# Patient Record
Sex: FEMALE | Race: OTHER | NOT HISPANIC OR LATINO
[De-identification: names, ages, dates, MRNs, and addresses within clinical notes are randomized per-mention and may not be internally consistent; named-entity substitution may affect disease eponyms.]

---

## 2020-10-27 ENCOUNTER — TRANSCRIPTION ENCOUNTER (OUTPATIENT)
Age: 30
End: 2020-10-27

## 2020-12-22 ENCOUNTER — INPATIENT (INPATIENT)
Facility: HOSPITAL | Age: 30
LOS: 0 days | Discharge: HOME | DRG: 603 | End: 2020-12-23
Attending: SURGERY | Admitting: SURGERY
Payer: COMMERCIAL

## 2020-12-22 VITALS
WEIGHT: 199.96 LBS | HEIGHT: 64 IN | SYSTOLIC BLOOD PRESSURE: 142 MMHG | OXYGEN SATURATION: 99 % | DIASTOLIC BLOOD PRESSURE: 88 MMHG | HEART RATE: 107 BPM | RESPIRATION RATE: 16 BRPM | TEMPERATURE: 98 F

## 2020-12-22 LAB
ACETONE SERPL-MCNC: NEGATIVE — SIGNIFICANT CHANGE UP
ALBUMIN SERPL ELPH-MCNC: 3.7 G/DL — SIGNIFICANT CHANGE UP (ref 3.3–5.2)
ALP SERPL-CCNC: 79 U/L — SIGNIFICANT CHANGE UP (ref 40–120)
ALT FLD-CCNC: 15 U/L — SIGNIFICANT CHANGE UP
ANION GAP SERPL CALC-SCNC: 13 MMOL/L — SIGNIFICANT CHANGE UP (ref 5–17)
AST SERPL-CCNC: 11 U/L — SIGNIFICANT CHANGE UP
BASOPHILS # BLD AUTO: 0.02 K/UL — SIGNIFICANT CHANGE UP (ref 0–0.2)
BASOPHILS NFR BLD AUTO: 0.1 % — SIGNIFICANT CHANGE UP (ref 0–2)
BILIRUB SERPL-MCNC: 0.5 MG/DL — SIGNIFICANT CHANGE UP (ref 0.4–2)
BUN SERPL-MCNC: 8 MG/DL — SIGNIFICANT CHANGE UP (ref 8–20)
CALCIUM SERPL-MCNC: 10 MG/DL — SIGNIFICANT CHANGE UP (ref 8.6–10.2)
CHLORIDE SERPL-SCNC: 98 MMOL/L — SIGNIFICANT CHANGE UP (ref 98–107)
CO2 SERPL-SCNC: 26 MMOL/L — SIGNIFICANT CHANGE UP (ref 22–29)
CREAT SERPL-MCNC: 0.5 MG/DL — SIGNIFICANT CHANGE UP (ref 0.5–1.3)
EOSINOPHIL # BLD AUTO: 0.05 K/UL — SIGNIFICANT CHANGE UP (ref 0–0.5)
EOSINOPHIL NFR BLD AUTO: 0.3 % — SIGNIFICANT CHANGE UP (ref 0–6)
GLUCOSE SERPL-MCNC: 344 MG/DL — HIGH (ref 70–99)
HCG SERPL-ACNC: <4 MIU/ML — SIGNIFICANT CHANGE UP
HCT VFR BLD CALC: 41.3 % — SIGNIFICANT CHANGE UP (ref 34.5–45)
HGB BLD-MCNC: 14.5 G/DL — SIGNIFICANT CHANGE UP (ref 11.5–15.5)
IMM GRANULOCYTES NFR BLD AUTO: 0.4 % — SIGNIFICANT CHANGE UP (ref 0–1.5)
LYMPHOCYTES # BLD AUTO: 12.1 % — LOW (ref 13–44)
LYMPHOCYTES # BLD AUTO: 2.08 K/UL — SIGNIFICANT CHANGE UP (ref 1–3.3)
MCHC RBC-ENTMCNC: 28.2 PG — SIGNIFICANT CHANGE UP (ref 27–34)
MCHC RBC-ENTMCNC: 35.1 GM/DL — SIGNIFICANT CHANGE UP (ref 32–36)
MCV RBC AUTO: 80.2 FL — SIGNIFICANT CHANGE UP (ref 80–100)
MONOCYTES # BLD AUTO: 1.19 K/UL — HIGH (ref 0–0.9)
MONOCYTES NFR BLD AUTO: 6.9 % — SIGNIFICANT CHANGE UP (ref 2–14)
NEUTROPHILS # BLD AUTO: 13.84 K/UL — HIGH (ref 1.8–7.4)
NEUTROPHILS NFR BLD AUTO: 80.2 % — HIGH (ref 43–77)
PLATELET # BLD AUTO: 237 K/UL — SIGNIFICANT CHANGE UP (ref 150–400)
POTASSIUM SERPL-MCNC: 4.1 MMOL/L — SIGNIFICANT CHANGE UP (ref 3.5–5.3)
POTASSIUM SERPL-SCNC: 4.1 MMOL/L — SIGNIFICANT CHANGE UP (ref 3.5–5.3)
PROT SERPL-MCNC: 7.7 G/DL — SIGNIFICANT CHANGE UP (ref 6.6–8.7)
RBC # BLD: 5.15 M/UL — SIGNIFICANT CHANGE UP (ref 3.8–5.2)
RBC # FLD: 12.3 % — SIGNIFICANT CHANGE UP (ref 10.3–14.5)
SODIUM SERPL-SCNC: 137 MMOL/L — SIGNIFICANT CHANGE UP (ref 135–145)
WBC # BLD: 17.25 K/UL — HIGH (ref 3.8–10.5)
WBC # FLD AUTO: 17.25 K/UL — HIGH (ref 3.8–10.5)

## 2020-12-22 PROCEDURE — 71260 CT THORAX DX C+: CPT | Mod: 26

## 2020-12-22 PROCEDURE — 99284 EMERGENCY DEPT VISIT MOD MDM: CPT

## 2020-12-22 RX ORDER — ACETAMINOPHEN 500 MG
650 TABLET ORAL EVERY 6 HOURS
Refills: 0 | Status: DISCONTINUED | OUTPATIENT
Start: 2020-12-22 | End: 2020-12-23

## 2020-12-22 RX ORDER — SODIUM CHLORIDE 9 MG/ML
1000 INJECTION INTRAMUSCULAR; INTRAVENOUS; SUBCUTANEOUS
Refills: 0 | Status: DISCONTINUED | OUTPATIENT
Start: 2020-12-22 | End: 2020-12-23

## 2020-12-22 RX ORDER — MORPHINE SULFATE 50 MG/1
4 CAPSULE, EXTENDED RELEASE ORAL ONCE
Refills: 0 | Status: DISCONTINUED | OUTPATIENT
Start: 2020-12-22 | End: 2020-12-22

## 2020-12-22 RX ORDER — SODIUM CHLORIDE 9 MG/ML
1000 INJECTION INTRAMUSCULAR; INTRAVENOUS; SUBCUTANEOUS ONCE
Refills: 0 | Status: COMPLETED | OUTPATIENT
Start: 2020-12-22 | End: 2020-12-22

## 2020-12-22 RX ORDER — KETOROLAC TROMETHAMINE 30 MG/ML
15 SYRINGE (ML) INJECTION ONCE
Refills: 0 | Status: DISCONTINUED | OUTPATIENT
Start: 2020-12-22 | End: 2020-12-22

## 2020-12-22 RX ADMIN — SODIUM CHLORIDE 2000 MILLILITER(S): 9 INJECTION INTRAMUSCULAR; INTRAVENOUS; SUBCUTANEOUS at 19:43

## 2020-12-22 RX ADMIN — SODIUM CHLORIDE 125 MILLILITER(S): 9 INJECTION INTRAMUSCULAR; INTRAVENOUS; SUBCUTANEOUS at 23:54

## 2020-12-22 RX ADMIN — Medication 15 MILLIGRAM(S): at 20:42

## 2020-12-22 RX ADMIN — Medication 100 MILLIGRAM(S): at 20:39

## 2020-12-22 RX ADMIN — Medication 650 MILLIGRAM(S): at 23:54

## 2020-12-22 RX ADMIN — SODIUM CHLORIDE 125 MILLILITER(S): 9 INJECTION INTRAMUSCULAR; INTRAVENOUS; SUBCUTANEOUS at 20:39

## 2020-12-22 RX ADMIN — MORPHINE SULFATE 4 MILLIGRAM(S): 50 CAPSULE, EXTENDED RELEASE ORAL at 19:43

## 2020-12-22 NOTE — ED STATDOCS - PROGRESS NOTE DETAILS
pt is seen by Dr Loza initially agreed with hx , PE and plan   seen the pt at the bed side states pain is improved with pain med firm abscess un mid thoracic spine . TTP and no d.c. pending CT called consulted surgery surgery request admission of the pt . consult endocrinology for uncontrol DM by   spoke with pt admit to surgery

## 2020-12-22 NOTE — ED ADULT NURSE NOTE - OBJECTIVE STATEMENT
Pt AAOX3, pt c/o an upper back abscess that she has had for the past 2 days, pt states that she had once had an abscess in her armpit but never her back, pt states that it has been getting larger, pt denies any drainage, pt denies fever/chills, pt states "it is very painful", pt respirations even and unlabored, pt denies chest pain/SOB, pt abdomen soft, nondistended, and nontender, pt able to move all extremities well

## 2020-12-22 NOTE — ED STATDOCS - ATTENDING CONTRIBUTION TO CARE
I, Dr. Loza, performed the initial face to face bedside interview with this patient regarding history of present illness, review of symptoms and relevant past medical, social and family history.  I completed an independent physical examination.  I was the initial provider who evaluated this patient. I have signed out the follow up of any pending tests (i.e. labs, radiological studies) to the ACP.  I have communicated the patient’s plan of care and disposition with the ACP.

## 2020-12-22 NOTE — ED STATDOCS - SKIN, MLM
large 4 x3 cm firm lesion palpated to right mid upper back, surrounding erythema, no fluctuance palpating

## 2020-12-22 NOTE — ED STATDOCS - CARE PLAN
Principal Discharge DX:	Abscess of back  Secondary Diagnosis:	Hyperglycemia   Principal Discharge DX:	Abscess of back  Assessment and plan of treatment:	cellulitic upper back  Secondary Diagnosis:	Hyperglycemia

## 2020-12-22 NOTE — ED STATDOCS - OBJECTIVE STATEMENT
30 y.o F with a PMHx of DM and Abscess presents to the ED with c/o a painful abscess in the back that started occurred two days ago. Pt has a hx of abscesses in her arms but not in the back area. Pt was referred to the ED by urgent care. Pt states that she only has had an abscess in her armpit one time. Pt notes that the mass in her back has been growing. Pt has been a diabetic since 2016. Pt also notes some tenderness along the left side of her neck.

## 2020-12-22 NOTE — ED STATDOCS - CLINICAL SUMMARY MEDICAL DECISION MAKING FREE TEXT BOX
Pt with cellulitis to right upper back, possible abscess, deeper will check labs, order ct scan, pain meds, IV, and reassess. No drainage should be done at this tome due fluctuance.

## 2020-12-23 ENCOUNTER — TRANSCRIPTION ENCOUNTER (OUTPATIENT)
Age: 30
End: 2020-12-23

## 2020-12-23 VITALS
DIASTOLIC BLOOD PRESSURE: 81 MMHG | HEART RATE: 97 BPM | RESPIRATION RATE: 17 BRPM | TEMPERATURE: 98 F | OXYGEN SATURATION: 98 % | SYSTOLIC BLOOD PRESSURE: 124 MMHG

## 2020-12-23 DIAGNOSIS — L02.212 CUTANEOUS ABSCESS OF BACK [ANY PART, EXCEPT BUTTOCK]: ICD-10-CM

## 2020-12-23 LAB
A1C WITH ESTIMATED AVERAGE GLUCOSE RESULT: 12.9 % — HIGH (ref 4–5.6)
ANION GAP SERPL CALC-SCNC: 11 MMOL/L — SIGNIFICANT CHANGE UP (ref 5–17)
BASOPHILS # BLD AUTO: 0.02 K/UL — SIGNIFICANT CHANGE UP (ref 0–0.2)
BASOPHILS NFR BLD AUTO: 0.1 % — SIGNIFICANT CHANGE UP (ref 0–2)
BUN SERPL-MCNC: 9 MG/DL — SIGNIFICANT CHANGE UP (ref 8–20)
CALCIUM SERPL-MCNC: 8.7 MG/DL — SIGNIFICANT CHANGE UP (ref 8.6–10.2)
CHLORIDE SERPL-SCNC: 102 MMOL/L — SIGNIFICANT CHANGE UP (ref 98–107)
CO2 SERPL-SCNC: 23 MMOL/L — SIGNIFICANT CHANGE UP (ref 22–29)
CREAT SERPL-MCNC: 0.44 MG/DL — LOW (ref 0.5–1.3)
EOSINOPHIL # BLD AUTO: 0.19 K/UL — SIGNIFICANT CHANGE UP (ref 0–0.5)
EOSINOPHIL NFR BLD AUTO: 1.2 % — SIGNIFICANT CHANGE UP (ref 0–6)
ESTIMATED AVERAGE GLUCOSE: 324 MG/DL — HIGH (ref 68–114)
GLUCOSE BLDC GLUCOMTR-MCNC: 259 MG/DL — HIGH (ref 70–99)
GLUCOSE BLDC GLUCOMTR-MCNC: 266 MG/DL — HIGH (ref 70–99)
GLUCOSE BLDC GLUCOMTR-MCNC: 289 MG/DL — HIGH (ref 70–99)
GLUCOSE SERPL-MCNC: 264 MG/DL — HIGH (ref 70–99)
HCT VFR BLD CALC: 35 % — SIGNIFICANT CHANGE UP (ref 34.5–45)
HGB BLD-MCNC: 12.4 G/DL — SIGNIFICANT CHANGE UP (ref 11.5–15.5)
IMM GRANULOCYTES NFR BLD AUTO: 0.5 % — SIGNIFICANT CHANGE UP (ref 0–1.5)
LYMPHOCYTES # BLD AUTO: 12.8 % — LOW (ref 13–44)
LYMPHOCYTES # BLD AUTO: 2.03 K/UL — SIGNIFICANT CHANGE UP (ref 1–3.3)
MAGNESIUM SERPL-MCNC: 1.7 MG/DL — SIGNIFICANT CHANGE UP (ref 1.6–2.6)
MCHC RBC-ENTMCNC: 28.7 PG — SIGNIFICANT CHANGE UP (ref 27–34)
MCHC RBC-ENTMCNC: 35.4 GM/DL — SIGNIFICANT CHANGE UP (ref 32–36)
MCV RBC AUTO: 81 FL — SIGNIFICANT CHANGE UP (ref 80–100)
MONOCYTES # BLD AUTO: 1.16 K/UL — HIGH (ref 0–0.9)
MONOCYTES NFR BLD AUTO: 7.3 % — SIGNIFICANT CHANGE UP (ref 2–14)
NEUTROPHILS # BLD AUTO: 12.34 K/UL — HIGH (ref 1.8–7.4)
NEUTROPHILS NFR BLD AUTO: 78.1 % — HIGH (ref 43–77)
PHOSPHATE SERPL-MCNC: 2.1 MG/DL — LOW (ref 2.4–4.7)
PLATELET # BLD AUTO: 212 K/UL — SIGNIFICANT CHANGE UP (ref 150–400)
POTASSIUM SERPL-MCNC: 3.9 MMOL/L — SIGNIFICANT CHANGE UP (ref 3.5–5.3)
POTASSIUM SERPL-SCNC: 3.9 MMOL/L — SIGNIFICANT CHANGE UP (ref 3.5–5.3)
RBC # BLD: 4.32 M/UL — SIGNIFICANT CHANGE UP (ref 3.8–5.2)
RBC # FLD: 12.4 % — SIGNIFICANT CHANGE UP (ref 10.3–14.5)
SARS-COV-2 RNA SPEC QL NAA+PROBE: SIGNIFICANT CHANGE UP
SODIUM SERPL-SCNC: 136 MMOL/L — SIGNIFICANT CHANGE UP (ref 135–145)
WBC # BLD: 15.82 K/UL — HIGH (ref 3.8–10.5)
WBC # FLD AUTO: 15.82 K/UL — HIGH (ref 3.8–10.5)

## 2020-12-23 PROCEDURE — 87070 CULTURE OTHR SPECIMN AEROBIC: CPT

## 2020-12-23 PROCEDURE — 87186 SC STD MICRODIL/AGAR DIL: CPT

## 2020-12-23 PROCEDURE — 82962 GLUCOSE BLOOD TEST: CPT

## 2020-12-23 PROCEDURE — 87205 SMEAR GRAM STAIN: CPT

## 2020-12-23 PROCEDURE — 96376 TX/PRO/DX INJ SAME DRUG ADON: CPT | Mod: XU

## 2020-12-23 PROCEDURE — 99285 EMERGENCY DEPT VISIT HI MDM: CPT | Mod: 25

## 2020-12-23 PROCEDURE — 82009 KETONE BODYS QUAL: CPT

## 2020-12-23 PROCEDURE — 36415 COLL VENOUS BLD VENIPUNCTURE: CPT

## 2020-12-23 PROCEDURE — 96375 TX/PRO/DX INJ NEW DRUG ADDON: CPT | Mod: XU

## 2020-12-23 PROCEDURE — 85025 COMPLETE CBC W/AUTO DIFF WBC: CPT

## 2020-12-23 PROCEDURE — 80053 COMPREHEN METABOLIC PANEL: CPT

## 2020-12-23 PROCEDURE — 96374 THER/PROPH/DIAG INJ IV PUSH: CPT | Mod: XU

## 2020-12-23 PROCEDURE — 83036 HEMOGLOBIN GLYCOSYLATED A1C: CPT

## 2020-12-23 PROCEDURE — 80048 BASIC METABOLIC PNL TOTAL CA: CPT

## 2020-12-23 PROCEDURE — 83735 ASSAY OF MAGNESIUM: CPT

## 2020-12-23 PROCEDURE — U0003: CPT

## 2020-12-23 PROCEDURE — 99243 OFF/OP CNSLTJ NEW/EST LOW 30: CPT | Mod: GC

## 2020-12-23 PROCEDURE — 71260 CT THORAX DX C+: CPT

## 2020-12-23 PROCEDURE — 84702 CHORIONIC GONADOTROPIN TEST: CPT

## 2020-12-23 PROCEDURE — 84100 ASSAY OF PHOSPHORUS: CPT

## 2020-12-23 PROCEDURE — 86769 SARS-COV-2 COVID-19 ANTIBODY: CPT

## 2020-12-23 RX ORDER — DEXTROSE 50 % IN WATER 50 %
25 SYRINGE (ML) INTRAVENOUS ONCE
Refills: 0 | Status: DISCONTINUED | OUTPATIENT
Start: 2020-12-23 | End: 2020-12-23

## 2020-12-23 RX ORDER — ACETAMINOPHEN 500 MG
2 TABLET ORAL
Qty: 0 | Refills: 0 | DISCHARGE
Start: 2020-12-23

## 2020-12-23 RX ORDER — SODIUM,POTASSIUM PHOSPHATES 278-250MG
1 POWDER IN PACKET (EA) ORAL ONCE
Refills: 0 | Status: DISCONTINUED | OUTPATIENT
Start: 2020-12-23 | End: 2020-12-23

## 2020-12-23 RX ORDER — SODIUM CHLORIDE 9 MG/ML
1000 INJECTION, SOLUTION INTRAVENOUS
Refills: 0 | Status: DISCONTINUED | OUTPATIENT
Start: 2020-12-23 | End: 2020-12-23

## 2020-12-23 RX ORDER — SENNA PLUS 8.6 MG/1
2 TABLET ORAL AT BEDTIME
Refills: 0 | Status: DISCONTINUED | OUTPATIENT
Start: 2020-12-23 | End: 2020-12-23

## 2020-12-23 RX ORDER — DEXTROSE 50 % IN WATER 50 %
12.5 SYRINGE (ML) INTRAVENOUS ONCE
Refills: 0 | Status: DISCONTINUED | OUTPATIENT
Start: 2020-12-23 | End: 2020-12-23

## 2020-12-23 RX ORDER — DEXTROSE 50 % IN WATER 50 %
15 SYRINGE (ML) INTRAVENOUS ONCE
Refills: 0 | Status: DISCONTINUED | OUTPATIENT
Start: 2020-12-23 | End: 2020-12-23

## 2020-12-23 RX ORDER — MAGNESIUM SULFATE 500 MG/ML
2 VIAL (ML) INJECTION ONCE
Refills: 0 | Status: DISCONTINUED | OUTPATIENT
Start: 2020-12-23 | End: 2020-12-23

## 2020-12-23 RX ORDER — INSULIN NPH HUM/REG INSULIN HM 70-30/ML
15 VIAL (ML) SUBCUTANEOUS
Qty: 2 | Refills: 0
Start: 2020-12-23 | End: 2021-01-21

## 2020-12-23 RX ORDER — MORPHINE SULFATE 50 MG/1
4 CAPSULE, EXTENDED RELEASE ORAL ONCE
Refills: 0 | Status: DISCONTINUED | OUTPATIENT
Start: 2020-12-23 | End: 2020-12-23

## 2020-12-23 RX ORDER — LACTOBACILLUS ACIDOPHILUS 100MM CELL
1 CAPSULE ORAL DAILY
Refills: 0 | Status: DISCONTINUED | OUTPATIENT
Start: 2020-12-23 | End: 2020-12-23

## 2020-12-23 RX ORDER — TRAMADOL HYDROCHLORIDE 50 MG/1
50 TABLET ORAL EVERY 6 HOURS
Refills: 0 | Status: DISCONTINUED | OUTPATIENT
Start: 2020-12-23 | End: 2020-12-23

## 2020-12-23 RX ORDER — ENOXAPARIN SODIUM 100 MG/ML
40 INJECTION SUBCUTANEOUS AT BEDTIME
Refills: 0 | Status: DISCONTINUED | OUTPATIENT
Start: 2020-12-23 | End: 2020-12-23

## 2020-12-23 RX ORDER — INSULIN LISPRO 100/ML
VIAL (ML) SUBCUTANEOUS
Refills: 0 | Status: DISCONTINUED | OUTPATIENT
Start: 2020-12-23 | End: 2020-12-23

## 2020-12-23 RX ORDER — TRAMADOL HYDROCHLORIDE 50 MG/1
1 TABLET ORAL
Qty: 8 | Refills: 0
Start: 2020-12-23 | End: 2020-12-24

## 2020-12-23 RX ORDER — KETOROLAC TROMETHAMINE 30 MG/ML
15 SYRINGE (ML) INJECTION EVERY 8 HOURS
Refills: 0 | Status: DISCONTINUED | OUTPATIENT
Start: 2020-12-23 | End: 2020-12-23

## 2020-12-23 RX ORDER — TRAMADOL HYDROCHLORIDE 50 MG/1
25 TABLET ORAL EVERY 6 HOURS
Refills: 0 | Status: DISCONTINUED | OUTPATIENT
Start: 2020-12-23 | End: 2020-12-23

## 2020-12-23 RX ORDER — ISOPROPYL ALCOHOL, BENZOCAINE .7; .06 ML/ML; ML/ML
1 SWAB TOPICAL
Qty: 100 | Refills: 1
Start: 2020-12-23 | End: 2021-02-10

## 2020-12-23 RX ORDER — GLUCAGON INJECTION, SOLUTION 0.5 MG/.1ML
1 INJECTION, SOLUTION SUBCUTANEOUS ONCE
Refills: 0 | Status: DISCONTINUED | OUTPATIENT
Start: 2020-12-23 | End: 2020-12-23

## 2020-12-23 RX ORDER — ACETAMINOPHEN 500 MG
650 TABLET ORAL EVERY 6 HOURS
Refills: 0 | Status: DISCONTINUED | OUTPATIENT
Start: 2020-12-23 | End: 2020-12-23

## 2020-12-23 RX ADMIN — Medication 100 MILLIGRAM(S): at 01:07

## 2020-12-23 RX ADMIN — SODIUM CHLORIDE 125 MILLILITER(S): 9 INJECTION INTRAMUSCULAR; INTRAVENOUS; SUBCUTANEOUS at 08:09

## 2020-12-23 RX ADMIN — Medication 9: at 12:30

## 2020-12-23 RX ADMIN — Medication 1 TABLET(S): at 12:30

## 2020-12-23 RX ADMIN — Medication 100 MILLIGRAM(S): at 05:35

## 2020-12-23 RX ADMIN — Medication 650 MILLIGRAM(S): at 12:30

## 2020-12-23 RX ADMIN — MORPHINE SULFATE 4 MILLIGRAM(S): 50 CAPSULE, EXTENDED RELEASE ORAL at 01:07

## 2020-12-23 RX ADMIN — Medication 6: at 09:02

## 2020-12-23 RX ADMIN — TRAMADOL HYDROCHLORIDE 25 MILLIGRAM(S): 50 TABLET ORAL at 08:05

## 2020-12-23 NOTE — ADVANCED PRACTICE NURSE CONSULT - ASSESSMENT
went to see pt in am pt is a+ox3 c/o 0 pain. she states has diabetes for 4 years and was on humalog pen injection 15 units 3xdaily. she stopped taking insulin a few months ago she stopped checking bg as well. @ this time she does nt have insurance due to change in employment. insurance will start in January t was educated about the importance using insulin @ this time and was advised to keep [facility] as her supplier in order to keep cost affordable. 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 a new glucometer provided. information about dr celaya provided so pt can fu w endocrine to improve glycemic control when insurance is available.

## 2020-12-23 NOTE — H&P ADULT - NSHPLABSRESULTS_GEN_ALL_CORE
LABS:                        14.5   17.25 )-----------( 237      ( 22 Dec 2020 19:55 )             41.3     12-22    137  |  98  |  8.0  ----------------------------<  344<H>  4.1   |  26.0  |  0.50    Ca    10.0      22 Dec 2020 19:55    TPro  7.7  /  Alb  3.7  /  TBili  0.5  /  DBili  x   /  AST  11  /  ALT  15  /  AlkPhos  79  12-22          IMAGING:    CT Chest w/ IV Cont (12.22.20 @ 22:32)    CHEST WALL AND LOWER NECK: 4.1 x 3.1 x 3.1 cm rounded collection containing an air-fluid level within the subcutaneous fat of the upper back, just right of midline, at the T1 level. Wall of the collection is not significantly thickened. Gas and inflammation within the surrounding subcutaneous fat. Overlying skin thickening. Heterogeneously enlarged left thyroid lobe. Nonspecific mildly prominent right axillary node measuring 2.6 x 1.1 cm (3:57).

## 2020-12-23 NOTE — H&P ADULT - ASSESSMENT
Patient is a 31yo F with history of uncontrolled IDDM, presenting with a 2-day history of back abscess.    -Recommend medicine admission for management of uncontrolled DM   -Incision and drainage at bedside, will send cultures x2  -Wound packed with WTD  -Daily dressing changes  -Surgery to follow on this admission

## 2020-12-23 NOTE — DISCHARGE NOTE PROVIDER - NSDCMRMEDTOKEN_GEN_ALL_CORE_FT
acetaminophen 325 mg oral tablet: 2 tab(s) orally every 6 hours  alcohol swabs : Apply topically to affected area 4 times a day   clindamycin 300 mg oral capsule: 2 cap(s) orally every 8 hours  Insulin Pen Needles, 4mm: 1 application subcutaneously 4 times a day. ** Use with insulin pen **   lancets: 1 application subcutaneously 4 times a day   NovoLIN 70/30 FlexPen subcutaneous suspension: 15 unit(s) subcutaneous in the morning and at bedtime   test strips (per patient&#x27;s insurance): 1 application subcutaneously 4 times a day. ** Compatible with patient&#x27;s glucometer **  traMADol 50 mg oral tablet: 1 tab(s) orally every 6 hours, As needed, Severe Pain (7 - 10) MDD:4

## 2020-12-23 NOTE — DISCHARGE NOTE PROVIDER - CARE PROVIDERS DIRECT ADDRESSES
[email or web address]

## 2020-12-23 NOTE — H&P ADULT - NSICDXFAMILYHX_GEN_ALL_CORE_FT
FAMILY HISTORY:  Father  Still living? Unknown  Family history of diabetes mellitus (DM), Age at diagnosis: Age Unknown

## 2020-12-23 NOTE — ADVANCED PRACTICE NURSE CONSULT - RECOMMEDATIONS
continue diabetes self management education  if pt is staying tonight pls start lantus @ 15 units qhs and moderate ss  if pt is going home pls consider insulin 70/30 vial and syringe from [facility] 15 units w breakfast and dinner  strips and lancets for countur next ez glucometer for 3xdaily

## 2020-12-23 NOTE — DISCHARGE NOTE NURSING/CASE MANAGEMENT/SOCIAL WORK - PATIENT PORTAL LINK FT
You can access the FollowMyHealth Patient Portal offered by [facility] by registering at the following website: [email or web address]. By joining [employer]’s FollowMyHealth portal, you will also be able to view your health information using other applications (apps) compatible with our system.

## 2020-12-23 NOTE — DISCHARGE NOTE PROVIDER - CARE PROVIDER_API CALL
Nevin Epperson)  EndocrinologyMetabDiabetes  [address], [place] [id number]  Phone: [phone]  Fax: [phone]  Follow Up Time:

## 2020-12-23 NOTE — ED CDU PROVIDER INITIAL DAY NOTE - MEDICAL DECISION MAKING DETAILS
dm uncontrol use to be  on insuline been off of the insuline x 3 month due to insurance issue   abscess and cellulitis in upper backs , called [facility] for I&D  will come to do it cont clindamycin IVF ,   DM control A1C, SW in AM for medication prescription   f.u surgery  Am labs

## 2020-12-23 NOTE — H&P ADULT - NSHPPHYSICALEXAM_GEN_ALL_CORE
GENERAL: Alert, well developed, in no acute distress.  MENTAL STATUS: AAOx3. Appropriate affect.  HEENT: PERRLA. EOMI. MMM.  Trachea midline.  RESPIRATORY: CTAB. No wheezing, rales or rhonchi.  CARDIOVASCULAR: RRR. No audible murmurs, rubs or gallops.   GASTROINTESTINAL: Abdomen soft, NT, ND, -R/-G.   INTEGUMENTARY: back mass, erythematous, indurated, non-fluctuant, tender to palpation   MUSCULOSKELETAL: No cyanosis or clubbing. No gross deformities.

## 2020-12-23 NOTE — DISCHARGE NOTE PROVIDER - HOSPITAL COURSE
HPI: Patient is a 29yo F with history of IDDM since 2016 presenting to the ED with a back mass. Patient reports she first noticed the mass 2 days ago, and since it has increase in size and became very tender. She mentions that she had prior hx of an abscess on her armpit that was I&D. Patient denies fevers, chills, chest pain, sob, n/v or generalized malaise.    Hospital Course: CT Chest was performed in ED which should round fluid collection (4.1x3.1x3.1 cm) w/ air-fluid level w/ surrounding gas and inflammation within upper back. Patient was admitted to the surgery service under Dr. Crane. Patient was started on Clindamycin. Leukocytosis was 17 on arrival. Patient had I&D of back abscess bedside. The wound was then packed with 1 inch plain packing and covered with 4x4 gauze and tape. Dressing changes were performed daily while in the hospital. Abx were transferred to [facility] and patient will complete a 10 day course. Patient is an insulin dependent diabetic, however does not check her sugars at home and does not have insurance so does not have any insulin. The diabetic educator was consulted during admission and scheduled patient to have 70/30 insulin until 2021 when her insurance kicks in. Voiding spontaneously. Tolerating regular diet well. Pain is well controlled at time of discharge. Patient was stable for discharge home w/ friend (RN) taught how to perform dressing changes daily.       Length of time preparing discharge > 30 minutes

## 2020-12-23 NOTE — CONSULT NOTE ADULT - SUBJECTIVE AND OBJECTIVE BOX
ACUTE CARE SURGERY CONSULT    HPI: Patient is a 31yo F with history of IDDM since 2016 presenting to the ED with a back mass. Patient reports she first noticed the mass 2 days ago, and since it has increase in size and became very tender. She mentions that she had prior hx of an abscess on her armpit that was i&d'ed. Patient denies fevers, chills, chest pain, sob, n/v or generalized malaise.      PAST MEDICAL HISTORY:  Type 2 diabetes mellitus without complication, unspecified whether long term insulin use      ALLERGIES:  No Known Allergies      MEDICATIONS  (STANDING):  clindamycin IVPB 600 milliGRAM(s) IV Intermittent every 8 hours  dextrose 40% Gel 15 Gram(s) Oral once  dextrose 5%. 1000 milliLiter(s) (50 mL/Hr) IV Continuous <Continuous>  dextrose 5%. 1000 milliLiter(s) (100 mL/Hr) IV Continuous <Continuous>  dextrose 50% Injectable 25 Gram(s) IV Push once  dextrose 50% Injectable 12.5 Gram(s) IV Push once  dextrose 50% Injectable 25 Gram(s) IV Push once  glucagon  Injectable 1 milliGRAM(s) IntraMuscular once  insulin lispro (ADMELOG) corrective regimen sliding scale   SubCutaneous three times a day before meals  lactobacillus acidophilus 1 Tablet(s) Oral daily  sodium chloride 0.9%. 1000 milliLiter(s) (125 mL/Hr) IV Continuous <Continuous>    MEDICATIONS  (PRN):  acetaminophen   Tablet .. 650 milliGRAM(s) Oral every 6 hours PRN Temp greater or equal to 38C (100.4F), Moderate Pain (4 - 6)  ketorolac   Injectable 15 milliGRAM(s) IV Push every 8 hours PRN Moderate Pain (4 - 6)      VITALS & I/Os:  Vital Signs Last 24 Hrs  T(C): 37.5 (22 Dec 2020 23:32), Max: 37.5 (22 Dec 2020 23:32)  T(F): 99.5 (22 Dec 2020 23:32), Max: 99.5 (22 Dec 2020 23:32)  HR: 97 (22 Dec 2020 23:32) (97 - 107)  BP: 121/77 (22 Dec 2020 23:32) (121/77 - 142/88)  BP(mean): --  RR: 16 (22 Dec 2020 23:32) (16 - 16)  SpO2: 100% (22 Dec 2020 23:32) (99% - 100%)  CAPILLARY BLOOD GLUCOSE      POCT Blood Glucose.: 289 mg/dL (23 Dec 2020 01:47)      I&O's Summary      GENERAL: Alert, well developed, in no acute distress.  MENTAL STATUS: AAOx3. Appropriate affect.  HEENT: PERRLA. EOMI. MMM.  Trachea midline.  RESPIRATORY: CTAB. No wheezing, rales or rhonchi.  CARDIOVASCULAR: RRR. No audible murmurs, rubs or gallops.   GASTROINTESTINAL: Abdomen soft, NT, ND, -R/-G.   INTEGUMENTARY: back mass, erythematous, indurated, non-fluctuant, tender to palpation   MUSCULOSKELETAL: No cyanosis or clubbing. No gross deformities.       LABS:                        14.5   17.25 )-----------( 237      ( 22 Dec 2020 19:55 )             41.3     12-22    137  |  98  |  8.0  ----------------------------<  344<H>  4.1   |  26.0  |  0.50    Ca    10.0      22 Dec 2020 19:55    TPro  7.7  /  Alb  3.7  /  TBili  0.5  /  DBili  x   /  AST  11  /  ALT  15  /  AlkPhos  79  12-22          IMAGING:    CT Chest w/ IV Cont (12.22.20 @ 22:32)    CHEST WALL AND LOWER NECK: 4.1 x 3.1 x 3.1 cm rounded collection containing an air-fluid level within the subcutaneous fat of the upper back, just right of midline, at the T1 level. Wall of the collection is not significantly thickened. Gas and inflammation within the surrounding subcutaneous fat. Overlying skin thickening. Heterogeneously enlarged left thyroid lobe. Nonspecific mildly prominent right axillary node measuring 2.6 x 1.1 cm (3:57).

## 2020-12-23 NOTE — ED CDU PROVIDER INITIAL DAY NOTE - OBJECTIVE STATEMENT
30 y.o F with a PMHx of DM was on insuline not taking it for past x3 months due to been out of health insurance and Abscess presents to the ED with c/o a painful abscess in the back that started occurred two days ago. pain initially was 10/10 at the side that non radiating.  Pt has a hx of abscesses in her arms but not in the back area. Pt was referred to the ED by urgent care. Pt states that she only has had an abscess in her armpit one time. Pt notes that the mass in her back has been growing. Pt has been a diabetic since 2016.   pt states she use to take 15 unit insuline Humalog with meal

## 2020-12-23 NOTE — CONSULT NOTE ADULT - ATTENDING COMMENTS
Agreed with note and exam  patient with known history of diabetes with back abscess requiring incision and drainage  patient tolerated drainage well  f/u wound cultures  admit to medicine for further management  surgery will continue to follow for wound care

## 2020-12-23 NOTE — ED CDU PROVIDER INITIAL DAY NOTE - PHYSICAL EXAMINATION
· CONSTITUTIONAL: well appearing and in no apparent distress.  · EYES: clear bilaterally.  Pupils equal, round, and reactive to light.  · ENMT: Nasal mucosa clear.  Mouth with normal mucosa  Throat has no vesicles, no oropharyngeal exudates and uvula is midline.  · CARDIAC: tachycardic rate, regular rhythm, and no murmur.  · RESPIRATORY: breath sounds clear and equal bilaterally.  · GASTROINTESTINAL: abdomen soft, non-tender, and non-distended. Bowel sounds present.  · MUSCULOSKELETAL: range of motion is not limited and there is no muscle tenderness.  · SKIN: large 4 x3 cm firm lesion palpated to right mid upper back, surrounding erythema, no fluctuance palpating

## 2020-12-23 NOTE — DISCHARGE NOTE PROVIDER - NSFOLLOWUPCLINICS_GEN_ALL_ED_FT
[facility] Acute Care Surgery  Acute Care Surgery  [address] [place] [id number]  Phone: [phone]  Fax:   Follow Up Time:

## 2020-12-23 NOTE — ED CDU PROVIDER INITIAL DAY NOTE - FAMILY HISTORY
Father  Still living? Unknown  Family history of diabetes mellitus (DM), Age at diagnosis: Age Unknown

## 2020-12-23 NOTE — ED ADULT NURSE REASSESSMENT NOTE - NS ED NURSE REASSESS COMMENT FT1
Patient resting comfortably and calm on stretcher.  Pt AxO4, VSS,. Pt appears in no distress at this time. No S/S o hyper/hypoglycamia noted Safety measures taken, bed in low position, call bell within reach, side rails up x2.  Will continue to monitor.
Surgery Resident at bedside draining abscess
Received patient from the main ED RN.  Pt AxO4, VSS.  Pt denies chest pain/SOB at this time.  Cardio NSR Lft AC. IV insertion site with angio cath #20 , flushing without difficulty. Middle upper back abcess dressing  with serosanguinous drainage and was reinforced  .Currently denies any pain. Support provided ,safety maintained ,Safety measures taken, bed in low position, call bell within reach, side rails up x2.  Plan of care explained.  Pt verbalized understanding.  Will continue to monitor.

## 2020-12-23 NOTE — H&P ADULT - NSICDXPASTMEDICALHX_GEN_ALL_CORE_FT
PAST MEDICAL HISTORY:  Type 2 diabetes mellitus without complication, unspecified whether long term insulin use

## 2020-12-23 NOTE — DISCHARGE NOTE PROVIDER - NSDCCPCAREPLAN_GEN_ALL_CORE_FT
PRINCIPAL DISCHARGE DIAGNOSIS  Diagnosis: Abscess of back  Assessment and Plan of Treatment: Follow Up: Please call to make an appointment w/ Acute Care Surgery clinic 3-5 days after discharge. Also, please call to make an appointment with your primary care physician as per your usual schedule.   Activity: May return to normal activities as tolerated, however take caution with your back and prevent from being hit in that area.   Diet: May continue regular diet.  Medications: Please take all home medications as prescribed by your primary care doctor. Pain medication has been prescribed for you. Please take it as prescribed, do not drive or operate heavy machinery while taking narcotics. You are encouraged to take over-the-counter tylenol and/or ibuprofen for pain relief when you feel your pain no longer warrants the use of narcotic pain medications.  Wound Care: Dressing should be taken down daily. The wound should be cleaned with peroxide. Then the wound should be packed with 1 inch plain packing to the level of the skin. The wound can then be covered with 4x4 gauze and taped to the skin. This dressing should be performed daily until seen in the Acute Care Surgery clinic.   Patient is advised to RETURN TO THE EMERGENCY DEPARTMENT for any of the following - worsening pain, fever/chills, nausea/vomiting, alterned mental status, chest pain, shortness of breath, or any other new/worsening symptoms.      SECONDARY DISCHARGE DIAGNOSES  Diagnosis: Hyperglycemia  Assessment and Plan of Treatment: You were seen by the Diabetic educator during your admission. She recommended to check your sugar 4 times per day (before each meal and at bedtime). You were also prescribed 70/30 insulin and should take 15 units in the morning and 15 units at night. You should follow up with Dr. Epperson as an outpatient.

## 2020-12-23 NOTE — H&P ADULT - HISTORY OF PRESENT ILLNESS
Patient is a 29yo F with history of IDDM since 2016 presenting to the ED with a back mass. Patient reports she first noticed the mass 2 days ago, and since it has increase in size and became very tender. She mentions that she had prior hx of an abscess on her armpit that was i&d'ed. Patient denies fevers, chills, chest pain, sob, n/v or generalized malaise.

## 2020-12-23 NOTE — ED ADULT NURSE REASSESSMENT NOTE - COMFORT CARE
ambulated to bathroom/plan of care explained/po fluids offered/side rails up/treatment delay explained/wait time explained/warm blanket provided

## 2020-12-25 LAB
-  AMIKACIN: SIGNIFICANT CHANGE UP
-  AMOXICILLIN/CLAVULANIC ACID: SIGNIFICANT CHANGE UP
-  AMPICILLIN/SULBACTAM: SIGNIFICANT CHANGE UP
-  AMPICILLIN: SIGNIFICANT CHANGE UP
-  AZTREONAM: SIGNIFICANT CHANGE UP
-  CEFAZOLIN: SIGNIFICANT CHANGE UP
-  CEFEPIME: SIGNIFICANT CHANGE UP
-  CEFOXITIN: SIGNIFICANT CHANGE UP
-  CEFTRIAXONE: SIGNIFICANT CHANGE UP
-  CIPROFLOXACIN: SIGNIFICANT CHANGE UP
-  ERTAPENEM: SIGNIFICANT CHANGE UP
-  GENTAMICIN: SIGNIFICANT CHANGE UP
-  LEVOFLOXACIN: SIGNIFICANT CHANGE UP
-  MEROPENEM: SIGNIFICANT CHANGE UP
-  PIPERACILLIN/TAZOBACTAM: SIGNIFICANT CHANGE UP
-  TOBRAMYCIN: SIGNIFICANT CHANGE UP
-  TRIMETHOPRIM/SULFAMETHOXAZOLE: SIGNIFICANT CHANGE UP
CULTURE RESULTS: SIGNIFICANT CHANGE UP
CULTURE RESULTS: SIGNIFICANT CHANGE UP
METHOD TYPE: SIGNIFICANT CHANGE UP
ORGANISM # SPEC MICROSCOPIC CNT: SIGNIFICANT CHANGE UP
ORGANISM # SPEC MICROSCOPIC CNT: SIGNIFICANT CHANGE UP
SARS-COV-2 IGG SERPL QL IA: NEGATIVE — SIGNIFICANT CHANGE UP
SARS-COV-2 IGM SERPL IA-ACNC: <0.1 INDEX — SIGNIFICANT CHANGE UP
SPECIMEN SOURCE: SIGNIFICANT CHANGE UP
SPECIMEN SOURCE: SIGNIFICANT CHANGE UP

## 2020-12-29 DIAGNOSIS — E11.65 TYPE 2 DIABETES MELLITUS WITH HYPERGLYCEMIA: ICD-10-CM

## 2020-12-29 DIAGNOSIS — L02.212 CUTANEOUS ABSCESS OF BACK [ANY PART, EXCEPT BUTTOCK]: ICD-10-CM

## 2020-12-29 DIAGNOSIS — B96.89 OTHER SPECIFIED BACTERIAL AGENTS AS THE CAUSE OF DISEASES CLASSIFIED ELSEWHERE: ICD-10-CM

## 2021-01-19 ENCOUNTER — TRANSCRIPTION ENCOUNTER (OUTPATIENT)
Age: 31
End: 2021-01-19

## 2021-02-02 PROBLEM — E11.9 TYPE 2 DIABETES MELLITUS WITHOUT COMPLICATIONS: Chronic | Status: ACTIVE | Noted: 2020-12-23

## 2021-02-03 PROBLEM — Z00.00 ENCOUNTER FOR PREVENTIVE HEALTH EXAMINATION: Status: ACTIVE | Noted: 2021-02-03

## 2021-02-05 ENCOUNTER — APPOINTMENT (OUTPATIENT)
Dept: FAMILY MEDICINE | Facility: CLINIC | Age: 31
End: 2021-02-05
Payer: COMMERCIAL

## 2021-02-05 VITALS
HEIGHT: 64 IN | BODY MASS INDEX: 35.51 KG/M2 | WEIGHT: 208 LBS | HEART RATE: 91 BPM | DIASTOLIC BLOOD PRESSURE: 80 MMHG | SYSTOLIC BLOOD PRESSURE: 122 MMHG

## 2021-02-05 DIAGNOSIS — Z83.3 FAMILY HISTORY OF DIABETES MELLITUS: ICD-10-CM

## 2021-02-05 DIAGNOSIS — L02.212 CUTANEOUS ABSCESS OF BACK [ANY PART, EXCEPT BUTTOCK]: ICD-10-CM

## 2021-02-05 DIAGNOSIS — Z78.9 OTHER SPECIFIED HEALTH STATUS: ICD-10-CM

## 2021-02-05 PROCEDURE — 36415 COLL VENOUS BLD VENIPUNCTURE: CPT

## 2021-02-05 PROCEDURE — 99072 ADDL SUPL MATRL&STAF TM PHE: CPT

## 2021-02-05 PROCEDURE — 99204 OFFICE O/P NEW MOD 45 MIN: CPT | Mod: 25

## 2021-02-05 RX ORDER — ETONOGESTREL 68 MG/1
IMPLANT SUBCUTANEOUS
Refills: 0 | Status: ACTIVE | COMMUNITY

## 2021-02-07 PROBLEM — L02.212 ABSCESS OF BACK: Status: ACTIVE | Noted: 2021-02-07

## 2021-02-07 LAB
ALBUMIN SERPL ELPH-MCNC: 4.3 G/DL
ALP BLD-CCNC: 55 U/L
ALT SERPL-CCNC: 13 U/L
ANION GAP SERPL CALC-SCNC: 13 MMOL/L
AST SERPL-CCNC: 15 U/L
BILIRUB SERPL-MCNC: 0.6 MG/DL
BUN SERPL-MCNC: 12 MG/DL
CALCIUM SERPL-MCNC: 9.9 MG/DL
CHLORIDE SERPL-SCNC: 99 MMOL/L
CHOLEST SERPL-MCNC: 161 MG/DL
CO2 SERPL-SCNC: 24 MMOL/L
CREAT SERPL-MCNC: 0.66 MG/DL
GLUCOSE SERPL-MCNC: 142 MG/DL
HDLC SERPL-MCNC: 47 MG/DL
LDLC SERPL CALC-MCNC: 89 MG/DL
NONHDLC SERPL-MCNC: 114 MG/DL
POTASSIUM SERPL-SCNC: 4.6 MMOL/L
PROT SERPL-MCNC: 7.3 G/DL
SODIUM SERPL-SCNC: 136 MMOL/L
T4 FREE SERPL-MCNC: 1.4 NG/DL
TRIGL SERPL-MCNC: 125 MG/DL
TSH SERPL-ACNC: 2.46 UIU/ML

## 2021-02-07 NOTE — HISTORY OF PRESENT ILLNESS
[FreeTextEntry1] : ESTABLISH CARE [de-identified] : MS. COLIN IS A PLEASANT 29 YO PRESENTING TO ESTABLISH CARE.  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  NOTES THAT SHE HAS BEEN NON-COMPLIANT TO HER MEDICAL CARE AND HAS NOT FOLLOWED UP WITH A PMD, ENDOCRINOLOGIST AND WAS NOT TAKING HER INSULIN AS DIRECTED.  IS IDDM.  MONITORING FS'S WHICH ARE \par  FLUCTUATING BETWEEN 118 - 160'S.  IS BEGINNING TO TURN AROUND AND GET HER HEALTH UNDER CONTROL.  DIET IS NOW "MUCH BETTER".  HAD BEEN DRINKING SODA BUT NOW QUIT DRINKING IT.  SOMETIMES JUICE.  NOT MONITORING CARBOHYDRATE INTAKE.  DID CUT DOWN ON RICE.  IS NOW BACK GOING TO THE GYM FOR EXERCISE.  THREE OR FOUR TIMES A WEEK - CARDIO AND WEIGHTS.\par SAW OPHTHALMOLOGY IN MAY - "[facility]".  STATES IT WAS AN OPHTHALMOLOGIST.  NOTES THAT SHE WAS DIAGNOSED FOUR YEARS AGO WITH DIABETES.  WAS IN ICU FOR TREATMENT.  ENDOCRINOLOGY: DR. AQUINO; LAST SEEN A FEW YEARS AGO.  RECENTLY IN HOSPITAL DUE TO AN ABSCESS ON HER BACK WHICH WAS TREATED AND HAD I & D DECEMBER 2020.  IS NOW ALMOST CLOSED.  HAS NOT HAD INSULIN SINCE WEDNESDAY - RAN OUT.  WOULD LIKE TO LOOSE WEIGHT.

## 2021-02-07 NOTE — PHYSICAL EXAM
[No Acute Distress] : no acute distress [Well Nourished] : well nourished [Well-Appearing] : well-appearing [Normal Sclera/Conjunctiva] : normal sclera/conjunctiva [PERRL] : pupils equal round and reactive to light [No Respiratory Distress] : no respiratory distress  [No Accessory Muscle Use] : no accessory muscle use [Clear to Auscultation] : lungs were clear to auscultation bilaterally [Normal Rate] : normal rate  [Regular Rhythm] : with a regular rhythm [Normal S1, S2] : normal S1 and S2 [Soft] : abdomen soft [Non Tender] : non-tender [Normal Bowel Sounds] : normal bowel sounds [No Joint Swelling] : no joint swelling [Grossly Normal Strength/Tone] : grossly normal strength/tone [Speech Grossly Normal] : speech grossly normal [Normal Affect] : the affect was normal [Normal Mood] : the mood was normal [Right Foot Was Examined] : Right foot ~C was examined [Left Foot Was Examined] : left foot ~C was examined [None] : no ulcers in either foot were found [] : both feet [de-identified] : MID UPPER BACK WITH CLOSING ABSCESS WITHOUT SURROUNDING ERYTHEMA.  SOME DRAINAGE NOTED ON BANDAGE

## 2021-02-07 NOTE — PLAN
[FreeTextEntry1] : I HAVE RECOMMENDED SURGERY FOLLOW-UP TO REASSESS ABSCESS\par KEEP SKIN CLEAN AND DRY AND ABSCESS COVERED\par AWARE WHEN TO SEEK EMERGENT CARE; FEVER, INCREASED DRAINAGE, REDNESS, ETC.\par CLOSE MONITORING OF FS'S.\par HEALTHY DIET AND LIFESTYLE MODIFICATIONS\par HEALTHY WEIGHT LOSS \par DOES NOT WANT TO SEE DIABETIC EDUCATOR OR NUTRITIONIST AT THIS TIME ("I KNOW WHAT TO DO")\par WILL RENEW MEDICATION PRESCRIPTION\par ARRANGED APPOINTMENT TO SEE ENDOCRINOLOGY THIS MONTH\par NEED OPHTHALMOLOGY CONSULTANT NOTE FOR REVIEW\par ROUTINE GYN EXAMS\par FOLLOW-UP FOR CPE\par COMPLIANCE ADVISED TO PREVENT ADVERSE OUTCOMES TO HEALTH \par COMMENDED ON PROGRESS\par RISKS OF UNCONTROLLED DIABETES DISCUSSED\par CALL WITH ANY QUESTIONS, CONCERNS OR CHANGES

## 2021-02-10 LAB
BASOPHILS # BLD AUTO: 0.04 K/UL
BASOPHILS NFR BLD AUTO: 0.3 %
EOSINOPHIL # BLD AUTO: 0.21 K/UL
EOSINOPHIL NFR BLD AUTO: 1.8 %
ESTIMATED AVERAGE GLUCOSE: 223 MG/DL
HBA1C MFR BLD HPLC: 9.4 %
HCT VFR BLD CALC: 42 %
HGB BLD-MCNC: 13.9 G/DL
IMM GRANULOCYTES NFR BLD AUTO: 0.2 %
LYMPHOCYTES # BLD AUTO: 3.75 K/UL
LYMPHOCYTES NFR BLD AUTO: 32.4 %
MAN DIFF?: NORMAL
MCHC RBC-ENTMCNC: 28 PG
MCHC RBC-ENTMCNC: 33.1 GM/DL
MCV RBC AUTO: 84.7 FL
MONOCYTES # BLD AUTO: 0.8 K/UL
MONOCYTES NFR BLD AUTO: 6.9 %
NEUTROPHILS # BLD AUTO: 6.75 K/UL
NEUTROPHILS NFR BLD AUTO: 58.4 %
PLATELET # BLD AUTO: 306 K/UL
RBC # BLD: 4.96 M/UL
RBC # FLD: 14.3 %
WBC # FLD AUTO: 11.57 K/UL

## 2021-02-15 ENCOUNTER — APPOINTMENT (OUTPATIENT)
Dept: ENDOCRINOLOGY | Facility: CLINIC | Age: 31
End: 2021-02-15
Payer: COMMERCIAL

## 2021-02-15 VITALS
HEIGHT: 64 IN | SYSTOLIC BLOOD PRESSURE: 118 MMHG | OXYGEN SATURATION: 99 % | HEART RATE: 94 BPM | WEIGHT: 205 LBS | DIASTOLIC BLOOD PRESSURE: 80 MMHG | BODY MASS INDEX: 35 KG/M2

## 2021-02-15 DIAGNOSIS — E66.9 OBESITY, UNSPECIFIED: ICD-10-CM

## 2021-02-15 LAB — GLUCOSE BLDC GLUCOMTR-MCNC: 118

## 2021-02-15 PROCEDURE — 99072 ADDL SUPL MATRL&STAF TM PHE: CPT

## 2021-02-15 PROCEDURE — 99204 OFFICE O/P NEW MOD 45 MIN: CPT | Mod: 25

## 2021-02-15 PROCEDURE — 82962 GLUCOSE BLOOD TEST: CPT

## 2021-02-15 NOTE — ASSESSMENT
[Diabetes Foot Care] : diabetes foot care [Long Term Vascular Complications] : long term vascular complications of diabetes [Carbohydrate Consistent Diet] : carbohydrate consistent diet [Importance of Diet and Exercise] : importance of diet and exercise to improve glycemic control, achieve weight loss and improve cardiovascular health [Exercise/Effect on Glucose] : exercise/effect on glucose [Hypoglycemia Management] : hypoglycemia management [Glucagon Use] : glucagon use [Action and use of Insulin] : action and use of short and long-acting insulin [Self Monitoring of Blood Glucose] : self monitoring of blood glucose [Insulin Self-Administration] : insulin self-administration [Injection Technique, Storage, Sharps Disposal] : injection technique, storage, and sharps disposal [Sick-Day Management] : sick-day management [Retinopathy Screening] : Patient was referred to ophthalmology for retinopathy screening [FreeTextEntry1] : T2DM\par -Long discussion had with patient regarding the severity of illness, complications of diabetes, medication action times, blood sugar goals. Pt admits to being lazy with her DM management but now has goals to get pregnant within a year or so and would like to be healthy/put her DM management as a priority. \par -For now, pt is on insulin but I do believe she can slowly transition off with addition of oral medications.\par -Begin   mg with largest meal of the day.\par -Decrease Novolin 70/30 to 13 units BID.\par -Pt works night shift and has a difficult time timing her insulin appropriately when on shift\par -Begin to increase FS frequency to BID. Send in logs weekly for further medication changes.\par -Continue to watch diet and eliminate the soda drinking. Pt would like to talk to CDE for diet teach.\par -Continue to exercise as tolerated, goal of 30 mins a day 5x a week.\par -Continue to follow up with all specialists including ophtho\par \par RTO 2-3 weeks with CDE for diet teach, 6 weeks NP\par

## 2021-02-15 NOTE — HISTORY OF PRESENT ILLNESS
[FreeTextEntry1] : New patient presents from Dr. 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 Has been non compliant with her medical care. Made some lifestyle changes this summer but then stopped exercising, drank soda etc. \par \par T2DM\par Severity: uncontrolled\par Onset: end of 2016 with polyuria ( was never told she had preDM)\par Modifying Factors: only ever on insulin to treat DM2 - was placed on a basal bolus regimen but stopped taking her medication on her own \par \par SMBG\par Checks BS 1x a day\par Fasting in AM 120s\par FS in office 118\par Denies hypoglycemia \par \par HGA1C 9.4\par \par Current drug regimen\par Novolin 70/30 15 units BID\par \par Eye exam: last exam 2020- denies DR\par Foot exam: cuts own toe nails- denies numbness/tingling in b/l feet \par Kidney disease: denies \par Heart disease: denies \par \par Weight: 80 lb weight loss over 2 years now stable BMI 35.19\par Diet: was drinking soda until apt in 12/2020\par B: yogurt with oatmeal, boiled eggs, Rasin bran or cheerios with almond milk \par L: vegetable soup\par D: chicken nuggets on way to night shift, whatever cafeteria has , fish , veggies, chicken breast\par S: not snacking \par Exercise: goes to the gym on days off , cardio for 30 mins \par Smoking: denies\par \par \par

## 2021-02-15 NOTE — REVIEW OF SYSTEMS
[Fatigue] : fatigue [Irregular Menses] : irregular menses [Recent Weight Gain (___ Lbs)] : no recent weight gain [Recent Weight Loss (___ Lbs)] : no recent weight loss [Visual Field Defect] : no visual field defect [Blurred Vision] : no blurred vision [Dysphagia] : no dysphagia [Neck Pain] : no neck pain [Dysphonia] : no dysphonia [Chest Pain] : no chest pain [Shortness Of Breath] : no shortness of breath [Constipation] : no constipation [Diarrhea] : no diarrhea [Polyuria] : no polyuria [Nocturia] : no nocturia [Polydipsia] : no polydipsia

## 2021-03-03 ENCOUNTER — APPOINTMENT (OUTPATIENT)
Dept: ENDOCRINOLOGY | Facility: CLINIC | Age: 31
End: 2021-03-03

## 2021-03-29 ENCOUNTER — APPOINTMENT (OUTPATIENT)
Dept: ENDOCRINOLOGY | Facility: CLINIC | Age: 31
End: 2021-03-29

## 2021-08-16 NOTE — ED ADULT TRIAGE NOTE - WEIGHT IN KG
Patient cleared for discharge, going home with .  Discussed medications, norco schedule and side effects, followups, activity restrictions, when to call MD/911.  PIVs removed.  No further questions or concerns.  Patient leaving with rx for norco - discussed side effects, symptoms to watch out for, etc at length.    90.7

## 2022-03-03 ENCOUNTER — APPOINTMENT (OUTPATIENT)
Dept: FAMILY MEDICINE | Facility: CLINIC | Age: 32
End: 2022-03-03

## 2023-05-26 ENCOUNTER — NON-APPOINTMENT (OUTPATIENT)
Age: 33
End: 2023-05-26

## 2023-08-24 ENCOUNTER — APPOINTMENT (OUTPATIENT)
Dept: OBGYN | Facility: CLINIC | Age: 33
End: 2023-08-24
Payer: COMMERCIAL

## 2023-08-24 VITALS — DIASTOLIC BLOOD PRESSURE: 89 MMHG | BODY MASS INDEX: 34.67 KG/M2 | SYSTOLIC BLOOD PRESSURE: 133 MMHG | WEIGHT: 202 LBS

## 2023-08-24 DIAGNOSIS — Z82.69 FAMILY HISTORY OF OTHER DISEASES OF THE MUSCULOSKELETAL SYSTEM AND CONNECTIVE TISSUE: ICD-10-CM

## 2023-08-24 DIAGNOSIS — E11.9 TYPE 2 DIABETES MELLITUS W/OUT COMPLICATIONS: ICD-10-CM

## 2023-08-24 PROCEDURE — 99395 PREV VISIT EST AGE 18-39: CPT

## 2023-08-24 RX ORDER — METFORMIN ER 500 MG 500 MG/1
500 TABLET ORAL
Qty: 90 | Refills: 1 | Status: COMPLETED | COMMUNITY
Start: 2021-02-15 | End: 2023-08-24

## 2023-08-24 RX ORDER — INSULIN HUMAN 100 [IU]/ML
(70-30) 100 INJECTION, SUSPENSION SUBCUTANEOUS TWICE DAILY
Qty: 1 | Refills: 0 | Status: COMPLETED | COMMUNITY
Start: 2021-02-05 | End: 2023-08-24

## 2023-08-24 RX ORDER — HUMAN INSULIN 100 [IU]/ML
(70-30) 100 INJECTION, SUSPENSION SUBCUTANEOUS
Qty: 1 | Refills: 0 | Status: COMPLETED | COMMUNITY
Start: 2021-02-05 | End: 2023-08-24

## 2023-08-24 RX ORDER — ELECTROLYTES/DEXTROSE
32G X 4 MM SOLUTION, ORAL ORAL
Qty: 1 | Refills: 0 | Status: COMPLETED | COMMUNITY
Start: 2021-02-05 | End: 2023-08-24
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
## 2023-08-24 NOTE — PLAN
[FreeTextEntry1] : gyn annual  declined exam today  Nexplanon for 7 year RTO for removal and full exam  support given for this stressful time

## 2023-08-24 NOTE — PHYSICAL EXAM
[Appropriately responsive] : appropriately responsive [Alert] : alert [Oriented x3] : oriented x3 [Depressed Mood] : depressed mood

## 2023-08-28 ENCOUNTER — APPOINTMENT (OUTPATIENT)
Dept: OBGYN | Facility: CLINIC | Age: 33
End: 2023-08-28
Payer: COMMERCIAL

## 2023-08-28 VITALS
SYSTOLIC BLOOD PRESSURE: 130 MMHG | DIASTOLIC BLOOD PRESSURE: 85 MMHG | HEIGHT: 64 IN | WEIGHT: 206.13 LBS | BODY MASS INDEX: 35.19 KG/M2

## 2023-08-28 DIAGNOSIS — L29.2 PRURITUS VULVAE: ICD-10-CM

## 2023-08-28 DIAGNOSIS — Z01.419 ENCOUNTER FOR GYNECOLOGICAL EXAMINATION (GENERAL) (ROUTINE) W/OUT ABNORMAL FINDINGS: ICD-10-CM

## 2023-08-28 DIAGNOSIS — Z30.017 ENCOUNTER FOR INITIAL PRESCRIPTION OF IMPLANTABLE SUBDERMAL CONTRACEPTIVE: ICD-10-CM

## 2023-08-28 LAB
HCG UR QL: NEGATIVE
QUALITY CONTROL: YES

## 2023-08-28 PROCEDURE — 11982 REMOVE DRUG IMPLANT DEVICE: CPT

## 2023-08-28 RX ORDER — NYSTATIN AND TRIAMCINOLONE ACETONIDE 100000; 1 [USP'U]/G; MG/G
100000-0.1 OINTMENT TOPICAL TWICE DAILY
Qty: 1 | Refills: 1 | Status: ACTIVE | COMMUNITY
Start: 2023-08-28 | End: 1900-01-01

## 2023-08-28 NOTE — COUNSELING
[Nutrition/ Exercise/ Weight Management] : nutrition, exercise, weight management [Contraception/ Emergency Contraception/ Safe Sexual Practices] : contraception, emergency contraception, safe sexual practices [Preconception Care/ Fertility options] : preconception care, fertility options [Medication Management] : medication management

## 2023-08-28 NOTE — HISTORY OF PRESENT ILLNESS
[FreeTextEntry1] : 31yo P0  here for nexplanon removal and completion or annual exam  nexplanon 7 years old  pt in better moodtoday  Pt notes vaginal itch before menses

## 2023-08-31 DIAGNOSIS — B96.89 ACUTE VAGINITIS: ICD-10-CM

## 2023-08-31 DIAGNOSIS — N76.0 ACUTE VAGINITIS: ICD-10-CM

## 2023-08-31 LAB
C TRACH RRNA SPEC QL NAA+PROBE: NOT DETECTED
CANDIDA VAG CYTO: NOT DETECTED
CYTOLOGY CVX/VAG DOC THIN PREP: NORMAL
G VAGINALIS+PREV SP MTYP VAG QL MICRO: DETECTED
HPV HIGH+LOW RISK DNA PNL CVX: NOT DETECTED
N GONORRHOEA RRNA SPEC QL NAA+PROBE: NOT DETECTED
SOURCE AMPLIFICATION: NORMAL
T VAGINALIS VAG QL WET PREP: NOT DETECTED

## 2023-08-31 RX ORDER — METRONIDAZOLE 500 MG/1
500 TABLET ORAL TWICE DAILY
Qty: 14 | Refills: 2 | Status: ACTIVE | COMMUNITY
Start: 2023-08-31 | End: 1900-01-01

## 2024-02-16 ENCOUNTER — RX ONLY (RX ONLY)
Age: 34
End: 2024-02-16

## 2024-02-16 ENCOUNTER — OFFICE (OUTPATIENT)
Dept: URBAN - METROPOLITAN AREA CLINIC 12 | Facility: CLINIC | Age: 34
Setting detail: OPHTHALMOLOGY
End: 2024-02-16
Payer: COMMERCIAL

## 2024-02-16 DIAGNOSIS — H17.9: ICD-10-CM

## 2024-02-16 DIAGNOSIS — E11.9: ICD-10-CM

## 2024-02-16 DIAGNOSIS — H25.13: ICD-10-CM

## 2024-02-16 DIAGNOSIS — H16.423: ICD-10-CM

## 2024-02-16 PROCEDURE — 99204 OFFICE O/P NEW MOD 45 MIN: CPT | Performed by: OPHTHALMOLOGY

## 2024-02-16 PROCEDURE — 92250 FUNDUS PHOTOGRAPHY W/I&R: CPT | Performed by: OPHTHALMOLOGY

## 2024-02-16 ASSESSMENT — REFRACTION_MANIFEST
OD_VA1: 20/NI
OD_SPHERE: -10.00
OS_VA1: 20/NI
OD_CYLINDER: -0.50
OS_SPHERE: +24.50
OS_CYLINDER: -0.25
OD_AXIS: 145
OS_AXIS: 092

## 2024-02-16 ASSESSMENT — REFRACTION_CURRENTRX
OS_OVR_VA: 20/
OD_VPRISM_DIRECTION: SV
OD_AXIS: 052
OS_SPHERE: -7.50
OD_OVR_VA: 20/
OS_VPRISM_DIRECTION: SV
OD_CYLINDER: -0.50
OS_AXIS: 034
OS_CYLINDER: -0.50
OD_SPHERE: -9.75

## 2024-02-16 ASSESSMENT — SPHEQUIV_DERIVED
OS_SPHEQUIV: 24.375
OS_SPHEQUIV: 24.375
OD_SPHEQUIV: -10.25
OD_SPHEQUIV: -10.25

## 2024-02-16 ASSESSMENT — CONFRONTATIONAL VISUAL FIELD TEST (CVF)
OD_FINDINGS: FULL
OS_FINDINGS: FULL

## 2024-02-16 ASSESSMENT — REFRACTION_AUTOREFRACTION
OS_CYLINDER: -0.25
OD_SPHERE: -10.00
OS_SPHERE: +24.50
OD_AXIS: 145
OS_AXIS: 092
OD_CYLINDER: -0.50

## 2024-02-16 ASSESSMENT — VASCULARIZATION
OS_VASCULARIZATION: PANNUS
OD_VASCULARIZATION: PANNUS

## 2024-02-24 PROBLEM — H25.13 CATARACT; BOTH EYES: Status: ACTIVE | Noted: 2024-02-16

## 2024-02-24 PROBLEM — H17.9 CORNEAL SCAR ; BOTH EYES: Status: ACTIVE | Noted: 2024-02-16

## 2024-02-24 PROBLEM — E11.9 DIABETES TYPE 2 NO RETINOPATHY ; BOTH EYES: Status: ACTIVE | Noted: 2024-02-16

## 2024-02-24 PROBLEM — H16.423 CORNEAL PANNUS; BOTH EYES: Status: ACTIVE | Noted: 2024-02-16

## 2024-02-24 PROBLEM — H52.7 REFRACTIVE ERROR ; BOTH EYES: Status: ACTIVE | Noted: 2024-02-16

## 2024-02-24 PROBLEM — H43.393 VITREOUS FLOATERS; BOTH EYES: Status: ACTIVE | Noted: 2024-02-16

## 2024-02-24 PROBLEM — H44.23 MYOPIC DEGENERATION; BOTH EYES: Status: ACTIVE | Noted: 2024-02-16

## 2024-03-05 ENCOUNTER — OFFICE (OUTPATIENT)
Dept: URBAN - METROPOLITAN AREA CLINIC 12 | Facility: CLINIC | Age: 34
Setting detail: OPHTHALMOLOGY
End: 2024-03-05
Payer: COMMERCIAL

## 2024-03-05 DIAGNOSIS — H25.12: ICD-10-CM

## 2024-03-05 DIAGNOSIS — H25.13: ICD-10-CM

## 2024-03-05 PROCEDURE — 92136 OPHTHALMIC BIOMETRY: CPT | Mod: LT | Performed by: OPHTHALMOLOGY

## 2024-03-05 PROCEDURE — 92136 OPHTHALMIC BIOMETRY: CPT | Mod: TC | Performed by: OPHTHALMOLOGY

## 2024-03-05 PROCEDURE — 99213 OFFICE O/P EST LOW 20 MIN: CPT | Performed by: OPHTHALMOLOGY

## 2024-03-05 ASSESSMENT — REFRACTION_CURRENTRX
OD_CYLINDER: -0.50
OD_VPRISM_DIRECTION: SV
OS_CYLINDER: -0.50
OS_OVR_VA: 20/
OD_AXIS: 052
OD_SPHERE: -9.75
OS_SPHERE: -7.50
OD_OVR_VA: 20/
OS_AXIS: 034
OS_VPRISM_DIRECTION: SV

## 2024-03-05 ASSESSMENT — REFRACTION_MANIFEST
OS_CYLINDER: -0.25
OS_AXIS: 092
OS_VA1: 20/NI
OD_CYLINDER: -0.50
OS_SPHERE: +24.50
OD_AXIS: 145
OD_VA1: 20/NI
OD_SPHERE: -10.00

## 2024-03-05 ASSESSMENT — SPHEQUIV_DERIVED
OS_SPHEQUIV: 24.375
OD_SPHEQUIV: -10.25

## 2024-03-11 ENCOUNTER — ASC (OUTPATIENT)
Dept: URBAN - METROPOLITAN AREA SURGERY 8 | Facility: SURGERY | Age: 34
Setting detail: OPHTHALMOLOGY
End: 2024-03-11
Payer: COMMERCIAL

## 2024-03-11 DIAGNOSIS — H52.212: ICD-10-CM

## 2024-03-11 DIAGNOSIS — H25.12: ICD-10-CM

## 2024-03-11 DIAGNOSIS — H57.03: ICD-10-CM

## 2024-03-11 PROCEDURE — FEMTO PRECISION LASER CATARACT SURGERY: Mod: GY | Performed by: OPHTHALMOLOGY

## 2024-03-11 PROCEDURE — 66982 XCAPSL CTRC RMVL CPLX WO ECP: CPT | Mod: LT | Performed by: OPHTHALMOLOGY

## 2024-03-12 ENCOUNTER — OFFICE (OUTPATIENT)
Dept: URBAN - METROPOLITAN AREA CLINIC 12 | Facility: CLINIC | Age: 34
Setting detail: OPHTHALMOLOGY
End: 2024-03-12
Payer: COMMERCIAL

## 2024-03-12 DIAGNOSIS — Z96.1: ICD-10-CM

## 2024-03-12 PROCEDURE — 99024 POSTOP FOLLOW-UP VISIT: CPT | Performed by: OPTOMETRIST

## 2024-03-12 ASSESSMENT — REFRACTION_MANIFEST
OS_SPHERE: +24.50
OD_AXIS: 145
OS_VA1: 20/NI
OS_AXIS: 092
OD_SPHERE: -10.00
OD_CYLINDER: -0.50
OS_CYLINDER: -0.25
OD_VA1: 20/NI

## 2024-03-12 ASSESSMENT — REFRACTION_CURRENTRX
OD_AXIS: 052
OD_VPRISM_DIRECTION: SV
OS_AXIS: 034
OD_SPHERE: -9.75
OS_SPHERE: -7.50
OS_VPRISM_DIRECTION: SV
OD_CYLINDER: -0.50
OS_OVR_VA: 20/
OD_OVR_VA: 20/
OS_CYLINDER: -0.50

## 2024-03-12 ASSESSMENT — SPHEQUIV_DERIVED
OD_SPHEQUIV: -10.25
OS_SPHEQUIV: 24.375

## 2024-03-19 ENCOUNTER — OFFICE (OUTPATIENT)
Dept: URBAN - METROPOLITAN AREA CLINIC 12 | Facility: CLINIC | Age: 34
Setting detail: OPHTHALMOLOGY
End: 2024-03-19
Payer: COMMERCIAL

## 2024-03-19 ENCOUNTER — RX ONLY (RX ONLY)
Age: 34
End: 2024-03-19

## 2024-03-19 DIAGNOSIS — H25.11: ICD-10-CM

## 2024-03-19 PROCEDURE — 92136 OPHTHALMIC BIOMETRY: CPT | Mod: RT | Performed by: OPHTHALMOLOGY

## 2024-03-19 ASSESSMENT — REFRACTION_CURRENTRX
OD_CYLINDER: -0.50
OD_OVR_VA: 20/
OD_AXIS: 052
OS_AXIS: 034
OD_SPHERE: -9.75
OD_VPRISM_DIRECTION: SV
OS_CYLINDER: -0.50
OS_OVR_VA: 20/
OS_VPRISM_DIRECTION: SV
OS_SPHERE: -7.50

## 2024-03-19 ASSESSMENT — SPHEQUIV_DERIVED
OD_SPHEQUIV: -10.25
OS_SPHEQUIV: 24.375

## 2024-03-19 ASSESSMENT — REFRACTION_MANIFEST
OS_SPHERE: +24.50
OD_AXIS: 145
OD_VA1: 20/NI
OS_CYLINDER: -0.25
OS_AXIS: 092
OD_CYLINDER: -0.50
OS_VA1: 20/NI
OD_SPHERE: -10.00

## 2024-03-25 ENCOUNTER — ASC (OUTPATIENT)
Dept: URBAN - METROPOLITAN AREA SURGERY 8 | Facility: SURGERY | Age: 34
Setting detail: OPHTHALMOLOGY
End: 2024-03-25
Payer: COMMERCIAL

## 2024-03-25 DIAGNOSIS — H57.03: ICD-10-CM

## 2024-03-25 DIAGNOSIS — H25.11: ICD-10-CM

## 2024-03-25 DIAGNOSIS — H52.211: ICD-10-CM

## 2024-03-25 PROCEDURE — FEMTO FEMTOSECOND LASER: Mod: GY | Performed by: OPHTHALMOLOGY

## 2024-03-25 PROCEDURE — 66982 XCAPSL CTRC RMVL CPLX WO ECP: CPT | Mod: 79,RT | Performed by: OPHTHALMOLOGY

## 2024-03-26 ENCOUNTER — RX ONLY (RX ONLY)
Age: 34
End: 2024-03-26

## 2024-03-26 ENCOUNTER — OFFICE (OUTPATIENT)
Dept: URBAN - METROPOLITAN AREA CLINIC 12 | Facility: CLINIC | Age: 34
Setting detail: OPHTHALMOLOGY
End: 2024-03-26
Payer: COMMERCIAL

## 2024-03-26 DIAGNOSIS — Z96.1: ICD-10-CM

## 2024-03-26 PROBLEM — H25.11 CATARACT; RIGHT EYE: Status: RESOLVED | Noted: 2024-03-19 | Resolved: 2024-03-26

## 2024-03-26 PROCEDURE — 99024 POSTOP FOLLOW-UP VISIT: CPT | Performed by: OPTOMETRIST

## 2024-03-26 ASSESSMENT — REFRACTION_CURRENTRX
OS_OVR_VA: 20/
OD_AXIS: 052
OD_OVR_VA: 20/
OD_VPRISM_DIRECTION: SV
OS_SPHERE: -7.50
OS_CYLINDER: -0.50
OS_VPRISM_DIRECTION: SV
OD_SPHERE: -9.75
OS_AXIS: 034
OD_CYLINDER: -0.50

## 2024-03-26 ASSESSMENT — REFRACTION_MANIFEST
OS_CYLINDER: -0.25
OD_VA1: 20/NI
OD_AXIS: 145
OD_SPHERE: -10.00
OS_AXIS: 092
OD_CYLINDER: -0.50
OS_SPHERE: +24.50
OS_VA1: 20/NI

## 2024-03-26 ASSESSMENT — SPHEQUIV_DERIVED
OS_SPHEQUIV: 24.375
OD_SPHEQUIV: -10.25

## 2024-04-02 ENCOUNTER — OFFICE (OUTPATIENT)
Dept: URBAN - METROPOLITAN AREA CLINIC 12 | Facility: CLINIC | Age: 34
Setting detail: OPHTHALMOLOGY
End: 2024-04-02
Payer: COMMERCIAL

## 2024-04-02 DIAGNOSIS — Z96.1: ICD-10-CM

## 2024-04-02 PROBLEM — H53.003 AMBLYOPIA; BOTH EYES: Status: ACTIVE | Noted: 2024-03-05

## 2024-04-02 PROCEDURE — 99024 POSTOP FOLLOW-UP VISIT: CPT | Performed by: OPTOMETRIST

## 2024-04-16 ENCOUNTER — OFFICE (OUTPATIENT)
Dept: URBAN - METROPOLITAN AREA CLINIC 12 | Facility: CLINIC | Age: 34
Setting detail: OPHTHALMOLOGY
End: 2024-04-16
Payer: COMMERCIAL

## 2024-04-16 DIAGNOSIS — H25.13: ICD-10-CM

## 2024-04-16 PROCEDURE — 99024 POSTOP FOLLOW-UP VISIT: CPT | Performed by: OPHTHALMOLOGY

## 2024-05-30 NOTE — CONSULT NOTE ADULT - ASSESSMENT
Refilled per medication protocol. But patient is due for an upcoming 6 month f up appt, to non clinical to help schedule. Thanks.   Patient is a 31yo F with history of uncontrolled IDDM, presenting with a 2-day history of back abscess.    -Recommend medicine admission for management of uncontrolled DM   -Incision and drainage at bedside, will send cultures x2  -Wound packed with WTD  -Daily dressing changes  -Surgery to follow on this admission

## 2024-08-21 ENCOUNTER — TRANSCRIPTION ENCOUNTER (OUTPATIENT)
Age: 34
End: 2024-08-21

## 2024-08-22 ENCOUNTER — TRANSCRIPTION ENCOUNTER (OUTPATIENT)
Age: 34
End: 2024-08-22

## 2024-09-04 ENCOUNTER — TRANSCRIPTION ENCOUNTER (OUTPATIENT)
Age: 34
End: 2024-09-04

## 2024-09-30 ENCOUNTER — TRANSCRIPTION ENCOUNTER (OUTPATIENT)
Age: 34
End: 2024-09-30

## 2024-10-22 ENCOUNTER — TRANSCRIPTION ENCOUNTER (OUTPATIENT)
Age: 34
End: 2024-10-22

## 2024-11-15 ENCOUNTER — TRANSCRIPTION ENCOUNTER (OUTPATIENT)
Age: 34
End: 2024-11-15